# Patient Record
Sex: FEMALE | Race: WHITE
[De-identification: names, ages, dates, MRNs, and addresses within clinical notes are randomized per-mention and may not be internally consistent; named-entity substitution may affect disease eponyms.]

---

## 2015-12-07 VITALS — DIASTOLIC BLOOD PRESSURE: 65 MMHG | SYSTOLIC BLOOD PRESSURE: 115 MMHG

## 2018-04-24 ENCOUNTER — HOSPITAL ENCOUNTER (OUTPATIENT)
Dept: HOSPITAL 63 - US | Age: 63
Discharge: HOME | End: 2018-04-24
Attending: FAMILY MEDICINE
Payer: COMMERCIAL

## 2018-04-24 DIAGNOSIS — I70.0: Primary | ICD-10-CM

## 2018-04-24 PROCEDURE — 76770 US EXAM ABDO BACK WALL COMP: CPT

## 2018-04-24 NOTE — RAD
Ultrasound evaluation of the abdominal aorta 4/24/2018



Indication: Abdominal aortic aneurysm screening



Comparison study: None



Discussion: Ultrasound evaluation of the abdominal aorta is seen for purposes

of abdominal aortic aneurysm screening.  Severe diffuse atherosclerotic

vascular disease is noted.  Multifocal heavily calcified plaque is seen

throughout the abdominal aorta.  Some degree of distal aortic stenosis is not

excluded.  Plaque extending to the common iliac artery is noted.



Maximal diameter proximal abdominal aorta: 1.9 cm.  

Maximal diameter of the mid abdominal aorta: 1.6 cm.  

Maximal diameter of the distal abdominal aorta: 1.1 cm







Impression:



1.  No evidence of abdominal aortic aneurysm



2. Multifocal atherosclerotic vascular disease with areas of calcified plaque.

 Possible narrowing involving the distal aorta noted..  Correlate with

clinical symptoms and consider CT angiography if clinically indicated.